# Patient Record
(demographics unavailable — no encounter records)

---

## 2017-09-13 NOTE — MAMMOGRAPHY REPORT
BILATERAL DIGITAL SCREENING MAMMOGRAM TOMOSYNTHESIS WITH CAD: 9/12/2017

CLINICAL HISTORY: Routine screening.  Patient has no complaints.  





TECHNIQUE:  Breast tomosynthesis in addition to standard 2D mammography was performed. Current study 
was also evaluated with a Computer Aided Detection (CAD) system.  



COMPARISON: Comparison is made to exams dated:  9/7/2016 mammogram, 9/2/2015 mammogram, 8/28/2014 renetta
mogram, 8/26/2013 mammogram, 8/23/2012 mammogram, and 8/22/2011 mammogram - [facility]
ter.   



BREAST COMPOSITION:  The tissue of both breasts is heterogeneously dense, which may obscure small mas
ses.  



FINDINGS:  The parenchymal pattern is unchanged. No developing mass, architectural distortion or clus
ter of suspicious microcalcifications is seen in either breast.  



IMPRESSION:  ACR BI-RADS CATEGORY 2: BENIGN

There is no mammographic evidence of malignancy. A 1 year screening mammogram is recommended.  The pa
tient will receive written notification of the results.  





Approximately 10% of breast cancers are not detected with mammography. A negative mammographic report
 should not delay biopsy if a clinically suggestive mass is present.



Margie Pedraza M.D.          

ay/:9/12/2017 18:14:27  



Imaging Technologist: Lyric Lowery, [facility]

letter sent: Normal 1/2  

BI-RADS Code: ACR BI-RADS Category 2: Benign